# Patient Record
Sex: FEMALE | URBAN - METROPOLITAN AREA
[De-identification: names, ages, dates, MRNs, and addresses within clinical notes are randomized per-mention and may not be internally consistent; named-entity substitution may affect disease eponyms.]

---

## 2017-03-10 ENCOUNTER — OFFICE VISIT (OUTPATIENT)
Dept: FAMILY MEDICINE | Facility: CLINIC | Age: 37
End: 2017-03-10
Payer: COMMERCIAL

## 2017-03-10 VITALS
OXYGEN SATURATION: 98 % | BODY MASS INDEX: 26.63 KG/M2 | WEIGHT: 156 LBS | SYSTOLIC BLOOD PRESSURE: 98 MMHG | HEART RATE: 68 BPM | DIASTOLIC BLOOD PRESSURE: 66 MMHG | HEIGHT: 64 IN | TEMPERATURE: 97.5 F

## 2017-03-10 DIAGNOSIS — N93.8 DUB (DYSFUNCTIONAL UTERINE BLEEDING): ICD-10-CM

## 2017-03-10 DIAGNOSIS — N91.2 AMENORRHEA: ICD-10-CM

## 2017-03-10 DIAGNOSIS — Z00.00 ENCOUNTER FOR ROUTINE ADULT HEALTH EXAMINATION WITHOUT ABNORMAL FINDINGS: Primary | ICD-10-CM

## 2017-03-10 DIAGNOSIS — Z23 NEED FOR TDAP VACCINATION: ICD-10-CM

## 2017-03-10 PROCEDURE — 87624 HPV HI-RISK TYP POOLED RSLT: CPT | Performed by: FAMILY MEDICINE

## 2017-03-10 PROCEDURE — 99395 PREV VISIT EST AGE 18-39: CPT | Mod: 25 | Performed by: FAMILY MEDICINE

## 2017-03-10 PROCEDURE — 90471 IMMUNIZATION ADMIN: CPT | Performed by: FAMILY MEDICINE

## 2017-03-10 PROCEDURE — 99212 OFFICE O/P EST SF 10 MIN: CPT | Mod: 25 | Performed by: FAMILY MEDICINE

## 2017-03-10 PROCEDURE — 82947 ASSAY GLUCOSE BLOOD QUANT: CPT | Performed by: FAMILY MEDICINE

## 2017-03-10 PROCEDURE — G0145 SCR C/V CYTO,THINLAYER,RESCR: HCPCS | Performed by: FAMILY MEDICINE

## 2017-03-10 PROCEDURE — 90715 TDAP VACCINE 7 YRS/> IM: CPT | Performed by: FAMILY MEDICINE

## 2017-03-10 PROCEDURE — 36415 COLL VENOUS BLD VENIPUNCTURE: CPT | Performed by: FAMILY MEDICINE

## 2017-03-10 PROCEDURE — 84443 ASSAY THYROID STIM HORMONE: CPT | Performed by: FAMILY MEDICINE

## 2017-03-10 NOTE — MR AVS SNAPSHOT
After Visit Summary   3/10/2017    Gaye Thomas    MRN: [id number]           Patient Information     Date Of Birth          1980        Visit Information        Provider Department      3/10/2017 3:30 PM La Antonio MD [facility]        Today's Diagnoses     Encounter for routine adult health examination without abnormal findings    -  1    DUB (dysfunctional uterine bleeding)        Need for Tdap vaccination        Amenorrhea          Care Instructions      Preventive Health Recommendations  Female Ages 26 - 39  Yearly exam:   See your health care provider every year in order to    Review health changes.     Discuss preventive care.      Review your medicines if you your doctor has prescribed any.    Until age 30: Get a Pap test every three years (more often if you have had an abnormal result).    After age 30: Talk to your doctor about whether you should have a Pap test every 3 years or have a Pap test with HPV screening every 5 years.   You do not need a Pap test if your uterus was removed (hysterectomy) and you have not had cancer.  You should be tested each year for STDs (sexually transmitted diseases), if you're at risk.   Talk to your provider about how often to have your cholesterol checked.  If you are at risk for diabetes, you should have a diabetes test (fasting glucose).  Shots: Get a flu shot each year. Get a tetanus shot every 10 years.   Nutrition:     Eat at least 5 servings of fruits and vegetables each day.    Eat whole-grain bread, whole-wheat pasta and brown rice instead of white grains and rice.    Talk to your provider about Calcium and Vitamin D.     Lifestyle    Exercise at least 150 minutes a week (30 minutes a day, 5 days of the week). This will help you control your weight and prevent disease.    Limit alcohol to one drink per day.    No smoking.     Wear sunscreen to prevent skin cancer.    See your dentist every six months for an exam and  "cleaning.          Follow-ups after your visit        Future tests that were ordered for you today     Open Future Orders        Priority Expected Expires Ordered    Lipid Profile (Chol, Trig, HDL, LDL calc) Routine  6/10/2017 3/10/2017            Who to contact     If you have questions or need follow up information about today's clinic visit or your schedule please contact [facility] directly at [phone].  Normal or non-critical lab and imaging results will be communicated to you by [employer], letter or phone within 4 business days after the clinic has received the results. If you do not hear from us within 7 days, please contact the clinic through [facility] or phone. If you have a critical or abnormal lab result, we will notify you by phone as soon as possible.  Submit refill requests through [email or web address] or call your pharmacy and they will forward the refill request to us. Please allow 3 business days for your refill to be completed.          Additional Information About Your Visit        [employer] Information     [email or web address] gives you secure access to your electronic health record. If you see a primary care provider, you can also send messages to your care team and make appointments. If you have questions, please call your primary care clinic.  If you do not have a primary care provider, please call [phone] and they will assist you.        Care EveryWhere ID     This is your Care EveryWhere ID. This could be used by other organizations to access your [place] medical records  [id number]        Your Vitals Were     Pulse Temperature Height Last Period Pulse Oximetry Breastfeeding?    68 97.5  F (36.4  C) 5' 4.25\" (1.632 m) 02/03/2017 98% No    BMI (Body Mass Index)                   26.57 kg/m2            Blood Pressure from Last 3 Encounters:   03/10/17 98/66   07/12/16 114/67   08/28/15 112/71    Weight from Last 3 Encounters:   03/10/17 156 lb (70.8 kg)   07/12/16 155 lb 14.4 oz (70.7 kg)   08/28/15 " 151 lb 3.2 oz (68.6 kg)              We Performed the Following     Glucose     OFFICE/OUTPT VISIT,EST,LEVL II     Pap imaged thin layer screen with HPV - recommended age 30 - 65 years (select HPV order below)     TDAP (ADACEL AGES 11-64)     TSH with free T4 reflex        Primary Care Provider    None Specified       No primary provider on file.        Thank you!     Thank you for choosing [facility]  for your care. Our goal is always to provide you with excellent care. Hearing back from our patients is one way we can continue to improve our services. Please take a few minutes to complete the written survey that you may receive in the mail after your visit with us. Thank you!             Your Updated Medication List - Protect others around you: Learn how to safely use, store and throw away your medicines at www.disposemymeds.org.          This list is accurate as of: 3/10/17 11:59 PM.  Always use your most recent med list.                   Brand Name Dispense Instructions for use    fluticasone 50 MCG/ACT spray    FLONASE    1 g    Spray 1-2 sprays into both nostrils daily as needed for rhinitis or allergies       UNKNOWN TO PATIENT      Take 1 tablet by mouth daily Pt uses BCP-unsure of name       ZOLOFT PO      Take 1 tablet by mouth daily

## 2017-03-10 NOTE — NURSING NOTE
"Chief Complaint   Patient presents with     Physical       Initial BP 98/66 (BP Location: Left arm, Patient Position: Chair, Cuff Size: Adult Regular)  Pulse 68  Temp 97.5  F (36.4  C)  Ht 5' 4.25\" (1.632 m)  Wt 156 lb (70.8 kg)  LMP 02/03/2017  SpO2 98%  Breastfeeding? No  BMI 26.57 kg/m2 Estimated body mass index is 26.57 kg/(m^2) as calculated from the following:    Height as of this encounter: 5' 4.25\" (1.632 m).    Weight as of this encounter: 156 lb (70.8 kg).  BP completed using cuff size: regular    Health Maintenance that is potentially due pending provider review:  Pap Smear and   Health Maintenance Due   Topic Date Due     TETANUS IMMUNIZATION (SYSTEM ASSIGNED)  06/03/2000     PAP Q1 YR  08/28/2016         PAP--Possibly completing today, per Provider review and tdap today  "

## 2017-03-10 NOTE — PROGRESS NOTES
SUBJECTIVE:     CC: Gaye Thomas is an 36 year old woman who presents for preventive health visit.     Healthy Habits:    Do you get at least three servings of calcium containing foods daily (dairy, green leafy vegetables, etc.)? yes    Amount of exercise or daily activities, outside of work: 2-3 day(s) per week    Problems taking medications regularly No    Medication side effects: No    Have you had an eye exam in the past two years? yes    Do you see a dentist twice per year? yes    Do you have sleep apnea, excessive snoring or daytime drowsiness?no            Today's PHQ-2 Score: No flowsheet data found.    Abuse: Current or Past(Physical, Sexual or Emotional)- No  Do you feel safe in your environment - Yes    Social History   Substance Use Topics     Smoking status: Never Smoker     Smokeless tobacco: Never Used     Alcohol use No     The patient does not drink >3 drinks per day nor >7 drinks per week.    No results for input(s): CHOL, HDL, LDL, TRIG, CHOLHDLRATIO, NHDL in the last 74029 hours.    Reviewed orders with patient.  Reviewed health maintenance and updated orders accordingly - Yes    Mammo Decision Support:      Pertinent mammograms are reviewed under the imaging tab.  History of abnormal Pap smear: NO - age 30- 65 PAP every 3 years recommended    Reviewed and updated as needed this visit by clinical staff         Reviewed and updated as needed this visit by Provider        Past Medical History   Diagnosis Date     ASCUS favor benign 12/2014     neg HPV     Papanicolaou smear of cervix with low grade squamous intraepithelial lesion (LGSIL) (aka LSIL) 2/2012, 2/2013     neg HPV 2013, [place] - JEOVANY I      Past Surgical History   Procedure Laterality Date     [place] teeth         ROS:  C: NEGATIVE for fever, chills, change in weight  I: NEGATIVE for worrisome rashes, moles or lesions  E: NEGATIVE for vision changes or irritation  ENT: NEGATIVE for ear, mouth and throat problems  R: NEGATIVE for  significant cough or SOB  B: NEGATIVE for masses, tenderness or discharge  CV: NEGATIVE for chest pain, palpitations or peripheral edema  GI: NEGATIVE for nausea, abdominal pain, heartburn, or change in bowel habits  : NEGATIVE for unusual urinary or vaginal symptoms. Periods are regular.  M: NEGATIVE for significant arthralgias or myalgia  N: NEGATIVE for weakness, dizziness or paresthesias  P: NEGATIVE for changes in mood or affect    Problem list, Medication list, Allergies, and Medical/Social/Surgical histories reviewed in [facility] and updated as appropriate.  Labs reviewed in EPIC  BP Readings from Last 3 Encounters:   03/10/17 98/66   07/12/16 114/67   08/28/15 112/71    Wt Readings from Last 3 Encounters:   03/10/17 156 lb (70.8 kg)   07/12/16 155 lb 14.4 oz (70.7 kg)   08/28/15 151 lb 3.2 oz (68.6 kg)                  Patient Active Problem List   Diagnosis     Amenorrhea     Papanicolaou smear of cervix with low grade squamous intraepithelial lesion (LGSIL)     Past Surgical History   Procedure Laterality Date     [place] teeth         Social History   Substance Use Topics     Smoking status: Never Smoker     Smokeless tobacco: Never Used     Alcohol use No     Family History   Problem Relation Age of Onset     DIABETES Mother      Breast Cancer Mother      Asthma Father      Heart Failure Paternal Grandfather      Heart Failure Maternal Grandmother      Uterine Cancer Maternal Grandmother      Depression Mother      OSTEOPOROSIS Paternal Grandmother      Hyperlipidemia Father      Substance Abuse Father      Dementia Paternal Grandmother      Thyroid Disease Maternal Aunt      thyroidectomy         Current Outpatient Prescriptions   Medication Sig Dispense Refill     UNKNOWN TO PATIENT Take 1 tablet by mouth daily Pt uses BCP-unsure of name       Sertraline HCl (ZOLOFT PO) Take 1 tablet by mouth daily        fluticasone (FLONASE) 50 MCG/ACT nasal spray Spray 1-2 sprays into both nostrils daily as needed for  rhinitis or allergies (Patient not taking: Reported on 3/10/2017) 1 g 3     Allergies   Allergen Reactions     Augmentin      hives     Seasonal Allergies      Recent Labs   Lab Test  08/28/15   1412   TSH  1.40      OBJECTIVE:     There were no vitals taken for this visit.  EXAM:  GENERAL: healthy, alert and no distress  EYES: Eyes grossly normal to inspection, PERRL and conjunctivae and sclerae normal  HENT: ear canals and TM's normal, nose and mouth without ulcers or lesions  NECK: no adenopathy, no asymmetry, masses, or scars and thyroid normal to palpation  RESP: lungs clear to auscultation - no rales, rhonchi or wheezes  BREAST: normal without masses, tenderness or nipple discharge and no palpable axillary masses or adenopathy  CV: regular rate and rhythm, normal S1 S2, no S3 or S4, no murmur, click or rub, no peripheral edema and peripheral pulses strong  ABDOMEN: soft, nontender, no hepatosplenomegaly, no masses and bowel sounds normal  MS: no gross musculoskeletal defects noted, no edema  SKIN: no suspicious lesions or rashes  NEURO: Normal strength and tone, mentation intact and speech normal  PSYCH: mentation appears normal, affect normal/bright    ASSESSMENT/PLAN:     1. Encounter for routine adult health examination without abnormal findings  Discussed diet,calcium,exercise.Went over self breast exam.Thin prep was done.Eyes and teeth UTD.No immunizations needed today.See orders below for tests ordered and screening needed.    - Pap imaged thin layer screen with HPV - recommended age 30 - 65 years (select HPV order below)  - Lipid Profile (Chol, Trig, HDL, LDL calc); Future    2. DUB (dysfunctional uterine bleeding)  Will check labs , she is not fasting so will come back for fasting lipids check .  - TSH with free T4 reflex  - Glucose  Also, she will call back to let me know which BC pill she is on  May need to change it as she has not had her period for 5 weeks now and she is taking the pill as directed  ", no skipping  ,np change in diet but there is some more stress.  She is on ZOloft 150 mg daily dose for anxiety , which has been helpi g .  3. Need for Tdap vaccination  Got her tetanus shot   - TDAP (ADACEL AGES 11-64)    COUNSELING:   Reviewed preventive health counseling, as reflected in patient instructions       Regular exercise       Healthy diet/nutrition       Vision screening       Contraception       Family planning       Folic Acid Counseling         reports that she has never smoked. She has never used smokeless tobacco.    Estimated body mass index is 26.14 kg/(m^2) as calculated from the following:    Height as of 8/28/15: 5' 4.75\" (1.645 m).    Weight as of 7/12/16: 155 lb 14.4 oz (70.7 kg).       Counseling Resources:  ATP IV Guidelines  Pooled Cohorts Equation Calculator  Breast Cancer Risk Calculator  FRAX Risk Assessment  ICSI Preventive Guidelines  Dietary Guidelines for Americans, 2010  USDA's MyPlate  ASA Prophylaxis  Lung CA Screening    La Antonio MD  [facility]  "

## 2017-03-11 ASSESSMENT — PATIENT HEALTH QUESTIONNAIRE - PHQ9: SUM OF ALL RESPONSES TO PHQ QUESTIONS 1-9: 7

## 2017-03-13 LAB
GLUCOSE SERPL-MCNC: 89 MG/DL (ref 70–99)
TSH SERPL DL<=0.005 MIU/L-ACNC: 1.51 MU/L (ref 0.4–4)

## 2017-03-15 LAB
COPATH REPORT: NORMAL
PAP: NORMAL

## 2017-03-17 LAB
FINAL DIAGNOSIS: NORMAL
HPV HR 12 DNA CVX QL NAA+PROBE: NEGATIVE
HPV16 DNA SPEC QL NAA+PROBE: NEGATIVE
HPV18 DNA SPEC QL NAA+PROBE: NEGATIVE
SPECIMEN DESCRIPTION: NORMAL

## 2017-03-20 ENCOUNTER — MYC MEDICAL ADVICE (OUTPATIENT)
Dept: FAMILY MEDICINE | Facility: CLINIC | Age: 37
End: 2017-03-20

## 2017-03-21 ENCOUNTER — MYC MEDICAL ADVICE (OUTPATIENT)
Dept: FAMILY MEDICINE | Facility: CLINIC | Age: 37
End: 2017-03-21

## 2017-03-22 NOTE — TELEPHONE ENCOUNTER
LS,  Please see below and advise.  Pap pool had sent you a result note.  Thanks,  Saida Beasley RN

## 2017-04-07 ENCOUNTER — TELEPHONE (OUTPATIENT)
Dept: FAMILY MEDICINE | Facility: CLINIC | Age: 37
End: 2017-04-07

## 2017-04-07 NOTE — TELEPHONE ENCOUNTER
Notes Recorded by Lila Cleary RN on 4/7/2017 at 10:31 AM  3rd request.  Lila Cleary  Pap Tracking RN    ------    Notes Recorded by Lila Cleary RN on 3/29/2017 at 10:01 AM  2nd request.  Lila Cleary  Pap Tracking RN    ------    Notes Recorded by Lila Cleary, RN on 3/20/2017 at 10:40 AM  Hi Dr. Antonio,  35 yo with a  NIL pap, Neg HR HPV result. Please see her pap hx listed below. Would it be ok to recommend a cotest in 3 years now? Please advise.  Thanks,  Lila Cleary  Pap Tracking RN    2/2012: LSIL. [place] - WNL  2/2013: LSIL, neg HR HPV.   3/2013: [place] - JEOVANY I  12/2014: ASCUS, neg HPV  8/28/15: NIL pap, neg HPV. Plan cotest pap & HPV in 1 year  03/10/17: NIL pap, Neg HR HPV result. Plan provider review.

## 2017-05-15 DIAGNOSIS — Z30.41 ENCOUNTER FOR SURVEILLANCE OF CONTRACEPTIVE PILLS: Primary | ICD-10-CM

## 2017-05-15 NOTE — TELEPHONE ENCOUNTER
Pending Prescriptions:                       Disp   Refills    norethindrone-ethinyl estradiol (ORTHO-NO*28 tab*0            Sig: Take 1 tablet by mouth daily            Last Written Prescription Date: unknown  Last Fill Quantity: ?,  # refills: ?   Last Office Visit with [facility], [facility] or [facility] prescribing provider: 03/10/2017

## 2017-05-15 NOTE — TELEPHONE ENCOUNTER
Routing refill request to provider for review/approval because:  Drug not active on patient's medication list  Bela MORA RN

## 2017-06-08 DIAGNOSIS — Z30.41 ENCOUNTER FOR SURVEILLANCE OF CONTRACEPTIVE PILLS: ICD-10-CM

## 2017-06-09 RX ORDER — NORETHINDRONE AND ETHINYL ESTRADIOL 1 MG-35MCG
KIT ORAL
Qty: 28 TABLET | Refills: 0 | Status: SHIPPED | OUTPATIENT
Start: 2017-06-09 | End: 2018-03-12

## 2017-06-09 NOTE — TELEPHONE ENCOUNTER
Routing refill request to provider for review/approval because:  Drug not on the [facility] refill protocol for dx of DUB  Bela MORA RN    Pending Prescriptions:                       Disp   Refills    NORTREL 1/35, 28, 1-35 MG-MCG per tablet [*28 tab*0        Sig: TAKE 1 TABLET BY MOUTH DAILY        Last Written Prescription Date: 5/16/2017  Last Fill Quantity: 28,  # refills: 0   Last Office Visit with [facility], [facility] or [facility] prescribing provider: 3/10/2017

## 2018-03-12 ENCOUNTER — OFFICE VISIT (OUTPATIENT)
Dept: FAMILY MEDICINE | Facility: CLINIC | Age: 38
End: 2018-03-12
Payer: COMMERCIAL

## 2018-03-12 VITALS
SYSTOLIC BLOOD PRESSURE: 107 MMHG | TEMPERATURE: 98.2 F | HEIGHT: 64 IN | OXYGEN SATURATION: 96 % | WEIGHT: 155.1 LBS | DIASTOLIC BLOOD PRESSURE: 64 MMHG | BODY MASS INDEX: 26.48 KG/M2 | HEART RATE: 72 BPM

## 2018-03-12 DIAGNOSIS — F33.0 MILD EPISODE OF RECURRENT MAJOR DEPRESSIVE DISORDER (H): ICD-10-CM

## 2018-03-12 DIAGNOSIS — Z30.41 ENCOUNTER FOR SURVEILLANCE OF CONTRACEPTIVE PILLS: ICD-10-CM

## 2018-03-12 DIAGNOSIS — Z80.3 FAMILY HISTORY OF MALIGNANT NEOPLASM OF BREAST: ICD-10-CM

## 2018-03-12 DIAGNOSIS — Z00.00 ROUTINE GENERAL MEDICAL EXAMINATION AT A HEALTH CARE FACILITY: Primary | ICD-10-CM

## 2018-03-12 DIAGNOSIS — F33.8 SEASONAL AFFECTIVE DISORDER (H): ICD-10-CM

## 2018-03-12 PROCEDURE — 99395 PREV VISIT EST AGE 18-39: CPT | Performed by: FAMILY MEDICINE

## 2018-03-12 RX ORDER — SERTRALINE HYDROCHLORIDE 100 MG/1
100 TABLET, FILM COATED ORAL DAILY
Qty: 90 TABLET | Refills: 1 | Status: SHIPPED | OUTPATIENT
Start: 2018-03-12

## 2018-03-12 NOTE — PROGRESS NOTES
SUBJECTIVE:   CC: Gaye Thomas is an 37 year old woman who presents for preventive health visit.   Strong history of depression- since age 21  Has been on sertraline for many years- reports its helpful  Also has SEASONAL AFFECTIVE DISORDER , uses light box  With change in weather- expects her depression  to improve, when she is able to be outside more  She likes to walk outside- not as much activity during winter  She reports she most likely needs refill on zoloft- we looked the dose up on [facility]- and she has been getting 100 mg once daily - from  [facility]    She wants refill on oral contraceptive pills     Mom diagnosed with stage 2 breast cancer- post menopausal  She is doing wlel-   Gaye is not sure about mom's genetic studies- they were completed and would like to proceed with genetic ref    Physical   Annual:     Getting at least 3 servings of Calcium per day::  Yes    Bi-annual eye exam::  Yes    Dental care twice a year::  Yes    Sleep apnea or symptoms of sleep apnea::  None    Frequency of exercise::  2-3 days/week    Duration of exercise::  15-30 minutes    Taking medications regularly::  Yes    Medication side effects::  None    Additional concerns today::  No                PROBLEMS TO ADD ON...    Today's PHQ-2 Score:   PHQ-2 ( 1999 Pf[employer]) 3/12/2018   Q1: Little interest or pleasure in doing things 1   Q2: Feeling down, depressed or hopeless 1   PHQ-2 Score 2   Q1: Little interest or pleasure in doing things Several days   Q2: Feeling down, depressed or hopeless Several days   PHQ-2 Score 2       Abuse: Current or Past(Physical, Sexual or Emotional)- No  Do you feel safe in your environment - Yes    Social History   Substance Use Topics     Smoking status: Never Smoker     Smokeless tobacco: Never Used     Alcohol use No     No flowsheet data found.    Reviewed orders with patient.  Reviewed health maintenance and updated orders accordingly - Yes  Labs reviewed in  "[facility]    Patient under age 50, mutual decision reflected in health maintenance.      Pertinent mammograms are reviewed under the imaging tab.  History of abnormal Pap smear: NO - age 30- 65 PAP every 3 years recommended    Reviewed and updated as needed this visit by clinical staff  Tobacco  Allergies         Reviewed and updated as needed this visit by Provider            Review of Systems  C: NEGATIVE for fever, chills, change in weight  I: NEGATIVE for worrisome rashes, moles or lesions  E: NEGATIVE for vision changes or irritation  ENT: NEGATIVE for ear, mouth and throat problems  R: NEGATIVE for significant cough or SOB  B: NEGATIVE for masses, tenderness or discharge  CV: NEGATIVE for chest pain, palpitations or peripheral edema  GI: NEGATIVE for nausea, abdominal pain, heartburn, or change in bowel habits  : NEGATIVE for unusual urinary or vaginal symptoms. Periods are regular.  M: NEGATIVE for significant arthralgias or myalgia  N: NEGATIVE for weakness, dizziness or paresthesias  P: NEGATIVE for changes in mood or affect     OBJECTIVE:   /64  Pulse 72  Temp 98.2  F (36.8  C) (Oral)  Ht 5' 4.25\" (1.632 m)  Wt 155 lb 1.6 oz (70.4 kg)  LMP  (LMP Unknown)  SpO2 96%  BMI 26.42 kg/m2  Physical Exam  GENERAL: healthy, alert and no distress  EYES: Eyes grossly normal to inspection, PERRL and conjunctivae and sclerae normal  HENT: ear canals and TM's normal, nose and mouth without ulcers or lesions  NECK: no adenopathy, no asymmetry, masses, or scars and thyroid normal to palpation  RESP: lungs clear to auscultation - no rales, rhonchi or wheezes  BREAST: normal without masses, tenderness or nipple discharge and no palpable axillary masses or adenopathy  CV: regular rate and rhythm, normal S1 S2, no S3 or S4, no murmur, click or rub, no peripheral edema and peripheral pulses strong  ABDOMEN: soft, nontender, no hepatosplenomegaly, no masses and bowel sounds normal  MS: no gross musculoskeletal " defects noted, no edema  SKIN: no suspicious lesions or rashes  NEURO: Normal strength and tone, mentation intact and speech normal  PSYCH: mentation appears normal, affect normal/bright  PHQ-9 SCORE 3/10/2017 3/12/2017 3/12/2018   Total Score 7 7 5     ASSESSMENT/PLAN:   (Z00.00) Routine general medical examination at a health care facility  (primary encounter diagnosis)  Comment:   Plan: Please see patient instructions     (F33.0) Mild episode of recurrent major depressive disorder (H)  Comment: Plan: sertraline (ZOLOFT) 100 MG tablet once daily  We discussed consideration for dose change, she reports she has been stable on this dose for a long time would not like to change the dose for now and change in weather itself is helpful.  We also discussed adding physical activity of choice she likes to walk outside.  We discussed counseling and she is not interested in counseling at the moment  Medication refilled for 6 months  Return office visit is recommended in 5-6 months  Follow up earlier as needed  If concerns     (F33.9) Seasonal affective disorder (H)  Comment: Plan: uses light box     (Z30.41) Encounter for surveillance of contraceptive pills  Comment: Plan: norethindrone-ethinyl estradiol (NORTREL 1/35,       28,) 1-35 MG-MCG per tablet once daily .  Potential medication side effects were discussed with the patient; let me know if any occur.              (Z80.3) Family history of malignant neoplasm of breast  Plan: CANCER RISK MGMT/CANCER GENETIC COUNSELING         REFERRAL        Given family history of breast cancer in mom it is appropriate to consider genetic counseling and she is interested for numbers are given      COUNSELING:  Reviewed preventive health counseling, as reflected in patient instructions       Regular exercise       Healthy diet/nutrition         reports that she has never smoked. She has never used smokeless tobacco.    Estimated body mass index is 26.42 kg/(m^2) as calculated from the  "following:    Height as of this encounter: 5' 4.25\" (1.632 m).    Weight as of this encounter: 155 lb 1.6 oz (70.4 kg).       Counseling Resources:  ATP IV Guidelines  Pooled Cohorts Equation Calculator  Breast Cancer Risk Calculator  FRAX Risk Assessment  ICSI Preventive Guidelines  Dietary Guidelines for Americans, 2010  USDA's MyPlate  ASA Prophylaxis  Lung CA Screening    Jo Vega MD  [facility]  Answers for HPI/ROS submitted by the patient on 3/12/2018   PHQ-2 Score: 2    "

## 2018-03-12 NOTE — NURSING NOTE
"Chief Complaint   Patient presents with     Physical       Initial /64  Pulse 72  Temp 98.2  F (36.8  C) (Oral)  Ht 5' 4.25\" (1.632 m)  Wt 155 lb 1.6 oz (70.4 kg)  LMP  (LMP Unknown)  SpO2 96%  BMI 26.42 kg/m2 Estimated body mass index is 26.42 kg/(m^2) as calculated from the following:    Height as of this encounter: 5' 4.25\" (1.632 m).    Weight as of this encounter: 155 lb 1.6 oz (70.4 kg).  Medication Reconciliation: complete      Health Maintenance that is potentially due pending provider review:  NONE    n/a    YESICA Rosales  "

## 2018-03-12 NOTE — MR AVS SNAPSHOT
After Visit Summary   3/12/2018    Gaye Thomas    MRN: [id number]           Patient Information     Date Of Birth          1980        Visit Information        Provider Department      3/12/2018 9:30 AM Jo Vega MD [facility]        Today's Diagnoses     Routine general medical examination at a health care facility    -  1    Mild episode of recurrent major depressive disorder (H)        Seasonal affective disorder (H)        Encounter for surveillance of contraceptive pills        Family history of malignant neoplasm of breast          Care Instructions      Preventive Health Recommendations  Female Ages 26 - 39  Yearly exam:   See your health care provider every year in order to    Review health changes.     Discuss preventive care.      Review your medicines if you your doctor has prescribed any.    Until age 30: Get a Pap test every three years (more often if you have had an abnormal result).    After age 30: Talk to your doctor about whether you should have a Pap test every 3 years or have a Pap test with HPV screening every 5 years.   You do not need a Pap test if your uterus was removed (hysterectomy) and you have not had cancer.  You should be tested each year for STDs (sexually transmitted diseases), if you're at risk.   Talk to your provider about how often to have your cholesterol checked.  If you are at risk for diabetes, you should have a diabetes test (fasting glucose).  Shots: Get a flu shot each year. Get a tetanus shot every 10 years.   Nutrition:     Eat at least 5 servings of fruits and vegetables each day.    Eat whole-grain bread, whole-wheat pasta and brown rice instead of white grains and rice.    Talk to your provider about Calcium and Vitamin D.     Lifestyle    Exercise at least 150 minutes a week (30 minutes a day, 5 days of the week). This will help you control your weight and prevent disease.    Limit alcohol to one drink per day.    No  smoking.     Wear sunscreen to prevent skin cancer.    See your dentist every six months for an exam and cleaning.            Follow-ups after your visit        Additional Services     CANCER RISK MGMT/CANCER GENETIC COUNSELING REFERRAL       Your provider has referred you to the Cancer Risk Management Program - Cancer Genetic Counseling.    Reason for Referral: mom diagnosed with stage 2 breast cancer- post menopausal     We have a sent a notice to a staff member of the Cancer Risk Management Program to give you a call to assist with scheduling your appointment.  You may also call  [phone] ([phone]) to initiate scheduling.    Please be aware that coverage of these services is subject to the terms and limitations of your health insurance plan.  Call member services at your health plan with any benefit or coverage questions.      Please bring the completed family history sheet to your appointment in addition to any available outside medical records documenting your cancer diagnosis.                  Who to contact     If you have questions or need follow up information about today's clinic visit or your schedule please contact [facility] directly at [phone].  Normal or non-critical lab and imaging results will be communicated to you by [employer], letter or phone within 4 business days after the clinic has received the results. If you do not hear from us within 7 days, please contact the clinic through [employer] or phone. If you have a critical or abnormal lab result, we will notify you by phone as soon as possible.  Submit refill requests through [employer] or call your pharmacy and they will forward the refill request to us. Please allow 3 business days for your refill to be completed.          Additional Information About Your Visit        [employer] Information     [employer] gives you secure access to your electronic health record. If you see a primary care provider, you can also send  "messages to your care team and make appointments. If you have questions, please call your primary care clinic.  If you do not have a primary care provider, please call [phone] and they will assist you.        Care EveryWhere ID     This is your Care EveryWhere ID. This could be used by other organizations to access your [facility] medical records  [id number]        Your Vitals Were     Pulse Temperature Height Last Period Pulse Oximetry BMI (Body Mass Index)    72 98.2  F (36.8  C) (Oral) 5' 4.25\" (1.632 m) (LMP Unknown) 96% 26.42 kg/m2       Blood Pressure from Last 3 Encounters:   03/12/18 107/64   03/10/17 98/66   07/12/16 114/67    Weight from Last 3 Encounters:   03/12/18 155 lb 1.6 oz (70.4 kg)   03/10/17 156 lb (70.8 kg)   07/12/16 155 lb 14.4 oz (70.7 kg)              We Performed the Following     CANCER RISK MGMT/CANCER GENETIC COUNSELING REFERRAL          Today's Medication Changes          These changes are accurate as of 3/12/18 10:17 AM.  If you have any questions, ask your nurse or doctor.               These medicines have changed or have updated prescriptions.        Dose/Directions    norethindrone-ethinyl estradiol 1-35 MG-MCG per tablet   Commonly known as:  NORTREL 1/35 (28)   This may have changed:  See the new instructions.   Used for:  Encounter for surveillance of contraceptive pills   Changed by:  Jo Vega MD        Dose:  1 tablet   Take 1 tablet by mouth daily   Quantity:  90 tablet   Refills:  3       sertraline 100 MG tablet   Commonly known as:  ZOLOFT   This may have changed:    - medication strength  - how much to take   Used for:  Mild episode of recurrent major depressive disorder (H)   Changed by:  Jo Vega MD        Dose:  100 mg   Take 1 tablet (100 mg) by mouth daily   Quantity:  90 tablet   Refills:  1            Where to get your medicines      These medications were sent to [facility] [address] [place] [address] AT " [address] [address]    Hours:  24-hours Phone:  [phone]     sertraline 100 MG tablet         Call your pharmacy to confirm that your medication is ready for pickup. It may take up to 24 hours for them to receive the prescription. If the prescription is not ready within 3 business days, please contact your clinic or your provider.     We will let you know when these medications are ready. If you don't hear back within 3 business days, please contact us.     norethindrone-ethinyl estradiol 1-35 MG-MCG per tablet                Primary Care Provider Fax #    Physician No Ref-Primary [phone]       No address on file        Equal Access to Services     DOMINICK LOMBARDO : Elmira Noel, bibiana osman, louie contreras, sophie scott. [facility] [phone].    ATENCIÓN: Si habla español, tiene a finley disposición servicios gratuitos de asistencia lingüística. [facility] [phone].    We comply with applicable federal civil rights laws and [place] laws. We do not discriminate on the basis of race, color, national origin, age, disability, sex, sexual orientation, or gender identity.            Thank you!     Thank you for choosing [facility]  for your care. Our goal is always to provide you with excellent care. Hearing back from our patients is one way we can continue to improve our services. Please take a few minutes to complete the written survey that you may receive in the mail after your visit with us. Thank you!             Your Updated Medication List - Protect others around you: Learn how to safely use, store and throw away your medicines at www.disposemymeds.org.          This list is accurate as of 3/12/18 10:17 AM.  Always use your most recent med list.                   Brand Name Dispense Instructions for use Diagnosis    fluticasone 50 MCG/ACT spray    FLONASE    1 g    Spray 1-2 sprays into both nostrils daily as  needed for rhinitis or allergies    Chronic rhinitis       norethindrone-ethinyl estradiol 1-35 MG-MCG per tablet    NORTREL 1/35 (28)    90 tablet    Take 1 tablet by mouth daily    Encounter for surveillance of contraceptive pills       sertraline 100 MG tablet    ZOLOFT    90 tablet    Take 1 tablet (100 mg) by mouth daily    Mild episode of recurrent major depressive disorder (H)

## 2018-03-13 ASSESSMENT — PATIENT HEALTH QUESTIONNAIRE - PHQ9: SUM OF ALL RESPONSES TO PHQ QUESTIONS 1-9: 5

## 2020-03-10 ENCOUNTER — HEALTH MAINTENANCE LETTER (OUTPATIENT)
Age: 40
End: 2020-03-10

## 2020-11-05 ENCOUNTER — TRANSFERRED RECORDS (OUTPATIENT)
Dept: HEALTH INFORMATION MANAGEMENT | Facility: CLINIC | Age: 40
End: 2020-11-05

## 2020-11-22 ENCOUNTER — TRANSFERRED RECORDS (OUTPATIENT)
Dept: HEALTH INFORMATION MANAGEMENT | Facility: CLINIC | Age: 40
End: 2020-11-22

## 2020-11-22 LAB
CREAT SERPL-MCNC: 1.01 MG/DL (ref 0.57–1.11)
GFR SERPL CREATININE-BSD FRML MDRD: >60 ML/MIN/1.73M2
GLUCOSE SERPL-MCNC: 200 MG/DL (ref 65–100)
POTASSIUM SERPL-SCNC: 3.5 MMOL/L (ref 3.5–5)

## 2020-12-27 ENCOUNTER — HEALTH MAINTENANCE LETTER (OUTPATIENT)
Age: 40
End: 2020-12-27

## 2021-04-24 ENCOUNTER — HEALTH MAINTENANCE LETTER (OUTPATIENT)
Age: 41
End: 2021-04-24

## 2021-10-04 ENCOUNTER — HEALTH MAINTENANCE LETTER (OUTPATIENT)
Age: 41
End: 2021-10-04

## 2022-05-15 ENCOUNTER — HEALTH MAINTENANCE LETTER (OUTPATIENT)
Age: 42
End: 2022-05-15

## 2022-09-11 ENCOUNTER — HEALTH MAINTENANCE LETTER (OUTPATIENT)
Age: 42
End: 2022-09-11

## 2023-06-03 ENCOUNTER — HEALTH MAINTENANCE LETTER (OUTPATIENT)
Age: 43
End: 2023-06-03

## 2024-02-24 ENCOUNTER — HEALTH MAINTENANCE LETTER (OUTPATIENT)
Age: 44
End: 2024-02-24